# Patient Record
Sex: FEMALE | Race: WHITE | NOT HISPANIC OR LATINO | URBAN - METROPOLITAN AREA
[De-identification: names, ages, dates, MRNs, and addresses within clinical notes are randomized per-mention and may not be internally consistent; named-entity substitution may affect disease eponyms.]

---

## 2024-11-01 ENCOUNTER — EMERGENCY (EMERGENCY)
Facility: HOSPITAL | Age: 24
LOS: 1 days | Discharge: HOME | End: 2024-11-01
Admitting: EMERGENCY MEDICINE
Payer: MEDICAID

## 2024-11-01 VITALS
HEART RATE: 93 BPM | RESPIRATION RATE: 16 BRPM | WEIGHT: 169.98 LBS | DIASTOLIC BLOOD PRESSURE: 80 MMHG | TEMPERATURE: 98 F | OXYGEN SATURATION: 97 % | SYSTOLIC BLOOD PRESSURE: 116 MMHG

## 2024-11-01 DIAGNOSIS — H60.91 UNSPECIFIED OTITIS EXTERNA, RIGHT EAR: ICD-10-CM

## 2024-11-01 DIAGNOSIS — H92.09 OTALGIA, UNSPECIFIED EAR: ICD-10-CM

## 2024-11-01 PROCEDURE — 99283 EMERGENCY DEPT VISIT LOW MDM: CPT | Mod: 25

## 2024-11-01 RX ORDER — KETOROLAC TROMETHAMINE 30 MG/ML
30 INJECTION INTRAMUSCULAR; INTRAVENOUS ONCE
Refills: 0 | Status: DISCONTINUED | OUTPATIENT
Start: 2024-11-01 | End: 2024-11-01

## 2024-11-01 RX ADMIN — KETOROLAC TROMETHAMINE 30 MILLIGRAM(S): 30 INJECTION INTRAMUSCULAR; INTRAVENOUS at 23:14

## 2024-11-01 NOTE — ED ADULT NURSE NOTE - OBJECTIVE STATEMENT
Patient presents with right ear pain radiating down to the neck, discharge/drainage, difficulty and muffled hearing x 3 days. Patient stated she has frequent ear infection when she was younger, had Ciprodex at home which she starts taking it and is taking antibiotic that was prescribed to her. denies fever, chills, headache, nausea, vomiting, dizziness.

## 2024-11-01 NOTE — ED PROVIDER NOTE - CLINICAL SUMMARY MEDICAL DECISION MAKING FREE TEXT BOX
Pt presents to this ED for otitis externa and pain  VSS, no fevers or tenderness over the mastoid processes - mastoiditis unlikely  No FB noted on exam, denies FB placement  Denies changes to altittude, no TM perforation noted on PE - TM perforation unlikely  No vesicles/bloody effusions noted on PE, no recent dx of pneumoia - bullous myringitis unlikely  Otitis externa most likely diagnosis   All results reviewed with pt. Pt understands and agrees with plan. Agreed to follow up with pcp in 2-3 days.

## 2024-11-01 NOTE — ED PROVIDER NOTE - PATIENT PORTAL LINK FT
You can access the FollowMyHealth Patient Portal offered by [facility] by registering at the following website: [email or web address]. By joining [employer]’s FollowMyHealth portal, you will also be able to view your health information using other applications (apps) compatible with our system.

## 2024-11-01 NOTE — ED PROVIDER NOTE - NSFOLLOWUPINSTRUCTIONS_ED_ALL_ED_FT
Overview  Swimmer's ear (otitis externa) is inflammation or infection of the ear canal. This is the passage that leads from the outer ear to the eardrum. Any water, sand, or other debris that gets into the ear canal and stays there can cause swimmer's ear. Putting cotton swabs or other items in the ear to clean it can also cause this problem.    Swimmer's ear can be very painful. But you can treat the pain and infection with medicines. You should feel better in a few days.    Follow-up care is a key part of your treatment and safety. Be sure to make and go to all appointments, and call your doctor or nurse advice line (785 in most provinces and territories) if you are having problems. It's also a good idea to know your test results and keep a list of the medicines you take.    How can you care for yourself at home?  Cleaning and care  Use antibiotic drops as your doctor directs.  Do not insert eardrops (other than the antibiotic eardrops) or anything else into the ear unless your doctor has told you to.  Avoid getting water in the ear until the problem clears up. Use cotton lightly coated with petroleum jelly as an earplug. Do not use plastic earplugs.  Use a hair dryer set on low to carefully dry the ear after you shower.  To ease ear pain, hold a warm face cloth against your ear.  Take pain medicines exactly as directed.  If the doctor gave you a prescription medicine for pain, take it as prescribed.  If you are not taking a prescription pain medicine, ask your doctor if you can take an over-the-counter medicine.  Inserting eardrops  Warm the drops to body temperature by rolling the container in your hands. Or you can place it in a cup of warm water for a few minutes.  Lie down, with your ear facing up.  Place drops inside the ear. Follow your doctor's instructions (or the directions on the label) for how many drops to use. Gently wiggle the outer ear or pull the ear up and back to help the drops get into the ear.  It's important to keep the liquid in the ear canal for 3 to 5 minutes.  When should you call for help?  	  Call your doctor or nurse advice line now or seek immediate medical care if:    You have a new or higher fever.  You have new or worse pain, swelling, warmth, or redness around or behind your ear.  You have new or increasing pus or blood draining from your ear.  Watch closely for changes in your health, and be sure to contact your doctor or nurse advice line if:    You are not getting better after 2 days (48 hours).

## 2024-11-01 NOTE — ED PROVIDER NOTE - OBJECTIVE STATEMENT
25 yo F presents to this ED for ear pain and clogged hearing. Pt states that when she was younger she got frequent ear infections so "I just had a lot of ciprodex lying around". Well 4 days ago she started having pain and when the Ciprodex did not work she went to the ED. At the ED she was prescribed Augmentin and followed up with an ENT the next day who precribed abx drops. States that she couldn't get it so she called back and prednisone was added to the cocktail. Couldn't pick it up so she came here and is requesting anythign for the pain. Sates that pain is constand and throbbing. Denies d/c, bleeding from ear. Denies pain behind ear. Denies any recent changes in altitude. No changes to hearing. Denies putting FB in ear. Has not been swimming recently.

## 2024-11-01 NOTE — ED ADULT NURSE NOTE - CHIEF COMPLAINT QUOTE
Cervical Collar Clearance: The patient had a CT scan of the cervical spine demonstrating no acute injury  On exam, the patient had no midline point tenderness or paresthesias/numbness/weakness in the extremities  The patient had full range of motion (was then able to flex, extend, and rotate head laterally) without pain  There were no distracting injuries and the patient was not intoxicated  The patient's cervical spine was cleared radiologically and clinically  Cervical collar removed at this time       MOISES Antonio  8/21/2022 10:24 AM pt reports right ear pain, discharge, difficulty hearing x 3 days; denies fever

## 2024-11-01 NOTE — ED PROVIDER NOTE - PHYSICAL EXAMINATION
General: well developed, well nourished, no distress  Eyes: no scleral injection  External Auditory canal erythematous with white flakes noted. No tenderness over the mastoid processes. Some tenderness to maipulation of pinna    Neck: non-tender, full range of motion, supple.   Respiratory: unlabored breathing  Cardiovascular: no central cyanosis  Musculoskeletal: normal gait.   Extremities: normal range of motion, non-tender.  Neurologic: alert, oriented to person, oriented to place, oriented to time.    Skin: normal color.  Negative For: rash  Psychiatric: normal affect, normal insight, normal concentration

## 2025-03-13 ENCOUNTER — EMERGENCY (EMERGENCY)
Age: 25
LOS: 1 days | Discharge: HOME | End: 2025-03-13
Attending: EMERGENCY MEDICINE | Admitting: EMERGENCY MEDICINE
Payer: COMMERCIAL

## 2025-03-13 VITALS
HEART RATE: 80 BPM | TEMPERATURE: 98 F | OXYGEN SATURATION: 96 % | SYSTOLIC BLOOD PRESSURE: 128 MMHG | RESPIRATION RATE: 18 BRPM | DIASTOLIC BLOOD PRESSURE: 91 MMHG

## 2025-03-13 DIAGNOSIS — T74.21XA ADULT SEXUAL ABUSE, CONFIRMED, INITIAL ENCOUNTER: ICD-10-CM

## 2025-03-13 DIAGNOSIS — F98.8 OTHER SPECIFIED BEHAVIORAL AND EMOTIONAL DISORDERS WITH ONSET USUALLY OCCURRING IN CHILDHOOD AND ADOLESCENCE: ICD-10-CM
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
## 2025-03-13 DIAGNOSIS — F32.A DEPRESSION, UNSPECIFIED: ICD-10-CM

## 2025-03-13 DIAGNOSIS — J45.909 UNSPECIFIED ASTHMA, UNCOMPLICATED: ICD-10-CM
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
## 2025-03-13 PROBLEM — Z78.9 OTHER SPECIFIED HEALTH STATUS: Chronic | Status: ACTIVE | Noted: 2024-11-01

## 2025-03-13 LAB
ALBUMIN SERPL ELPH-MCNC: 3.9 G/DL — SIGNIFICANT CHANGE UP (ref 3.4–5)
ALP SERPL-CCNC: 76 U/L — SIGNIFICANT CHANGE UP (ref 40–120)
ALT FLD-CCNC: 19 U/L — SIGNIFICANT CHANGE UP (ref 12–42)
ANION GAP SERPL CALC-SCNC: 11 MMOL/L — SIGNIFICANT CHANGE UP (ref 9–16)
AST SERPL-CCNC: 23 U/L — SIGNIFICANT CHANGE UP (ref 15–37)
BILIRUB SERPL-MCNC: 0.5 MG/DL — SIGNIFICANT CHANGE UP (ref 0.2–1.2)
BUN SERPL-MCNC: 15 MG/DL — SIGNIFICANT CHANGE UP (ref 7–23)
CALCIUM SERPL-MCNC: 8.8 MG/DL — SIGNIFICANT CHANGE UP (ref 8.5–10.5)
CHLORIDE SERPL-SCNC: 106 MMOL/L — SIGNIFICANT CHANGE UP (ref 96–108)
CO2 SERPL-SCNC: 22 MMOL/L — SIGNIFICANT CHANGE UP (ref 22–31)
CREAT SERPL-MCNC: 0.72 MG/DL — SIGNIFICANT CHANGE UP (ref 0.5–1.3)
EGFR: 120 ML/MIN/1.73M2 — SIGNIFICANT CHANGE UP
EGFR: 120 ML/MIN/1.73M2 — SIGNIFICANT CHANGE UP
GLUCOSE SERPL-MCNC: 86 MG/DL — SIGNIFICANT CHANGE UP (ref 70–99)
HCG SERPL-ACNC: 1 MIU/ML — SIGNIFICANT CHANGE UP
HCT VFR BLD CALC: 38.6 % — SIGNIFICANT CHANGE UP (ref 34.5–45)
HGB BLD-MCNC: 12.7 G/DL — SIGNIFICANT CHANGE UP (ref 11.5–15.5)
HIV 1 & 2 AB SERPL IA.RAPID: SIGNIFICANT CHANGE UP
MCHC RBC-ENTMCNC: 27.5 PG — SIGNIFICANT CHANGE UP (ref 27–34)
MCHC RBC-ENTMCNC: 32.9 G/DL — SIGNIFICANT CHANGE UP (ref 32–36)
MCV RBC AUTO: 83.5 FL — SIGNIFICANT CHANGE UP (ref 80–100)
NRBC # BLD AUTO: 0 K/UL — SIGNIFICANT CHANGE UP (ref 0–0)
NRBC # FLD: 0 K/UL — SIGNIFICANT CHANGE UP (ref 0–0)
NRBC BLD AUTO-RTO: 0 /100 WBCS — SIGNIFICANT CHANGE UP (ref 0–0)
PLATELET # BLD AUTO: 244 K/UL — SIGNIFICANT CHANGE UP (ref 150–400)
PMV BLD: 10.1 FL — SIGNIFICANT CHANGE UP (ref 7–13)
POTASSIUM SERPL-MCNC: 3.6 MMOL/L — SIGNIFICANT CHANGE UP (ref 3.5–5.3)
POTASSIUM SERPL-SCNC: 3.6 MMOL/L — SIGNIFICANT CHANGE UP (ref 3.5–5.3)
PROT SERPL-MCNC: 7.3 G/DL — SIGNIFICANT CHANGE UP (ref 6.4–8.2)
RBC # BLD: 4.62 M/UL — SIGNIFICANT CHANGE UP (ref 3.8–5.2)
RBC # FLD: 13.6 % — SIGNIFICANT CHANGE UP (ref 10.3–14.5)
SODIUM SERPL-SCNC: 139 MMOL/L — SIGNIFICANT CHANGE UP (ref 132–145)
WBC # BLD: 7.07 K/UL — SIGNIFICANT CHANGE UP (ref 3.8–10.5)
WBC # FLD AUTO: 7.07 K/UL — SIGNIFICANT CHANGE UP (ref 3.8–10.5)

## 2025-03-13 PROCEDURE — 73630 X-RAY EXAM OF FOOT: CPT | Mod: 26,RT

## 2025-03-13 PROCEDURE — 99053 MED SERV 10PM-8AM 24 HR FAC: CPT

## 2025-03-13 PROCEDURE — 73610 X-RAY EXAM OF ANKLE: CPT | Mod: 26,RT

## 2025-03-13 PROCEDURE — 99284 EMERGENCY DEPT VISIT MOD MDM: CPT

## 2025-03-13 RX ORDER — LAMOTRIGINE 150 MG/1
0 TABLET ORAL
Refills: 0 | DISCHARGE

## 2025-03-13 RX ORDER — RALTEGRAVIR 400 MG/1
1 TABLET, FILM COATED ORAL
Qty: 60 | Refills: 0
Start: 2025-03-13 | End: 2025-04-11

## 2025-03-13 RX ORDER — DOXYCYCLINE HYCLATE 100 MG
1 TABLET ORAL
Qty: 14 | Refills: 0
Start: 2025-03-13 | End: 2025-03-19

## 2025-03-13 RX ORDER — ONDANSETRON HCL/PF 4 MG/2 ML
1 VIAL (ML) INJECTION
Qty: 1 | Refills: 0
Start: 2025-03-13

## 2025-03-13 RX ORDER — DEXTROAMPHETAMINE SACCHARATE, AMPHETAMINE ASPARTATE MONOHYDRATE, DEXTROAMPHETAMINE SULFATE AND AMPHETAMINE SULFATE 2.5; 2.5; 2.5; 2.5 MG/1; MG/1; MG/1; MG/1
0 CAPSULE, EXTENDED RELEASE ORAL
Refills: 0 | DISCHARGE

## 2025-03-13 RX ORDER — ACETAMINOPHEN 500 MG/5ML
650 LIQUID (ML) ORAL ONCE
Refills: 0 | Status: COMPLETED | OUTPATIENT
Start: 2025-03-13 | End: 2025-03-13

## 2025-03-13 RX ORDER — FLUCONAZOLE 150 MG
150 TABLET ORAL ONCE
Refills: 0 | Status: COMPLETED | OUTPATIENT
Start: 2025-03-13 | End: 2025-03-13

## 2025-03-13 RX ORDER — SERTRALINE 100 MG/1
0 TABLET, FILM COATED ORAL
Refills: 0 | DISCHARGE

## 2025-03-13 RX ORDER — METRONIDAZOLE 250 MG
500 TABLET ORAL ONCE
Refills: 0 | Status: COMPLETED | OUTPATIENT
Start: 2025-03-13 | End: 2025-03-13

## 2025-03-13 RX ORDER — DOXYCYCLINE HYCLATE 100 MG
100 TABLET ORAL ONCE
Refills: 0 | Status: COMPLETED | OUTPATIENT
Start: 2025-03-13 | End: 2025-03-13

## 2025-03-13 RX ORDER — LEVONORGESTREL 1.5 MG/1
1.5 TABLET ORAL ONCE
Refills: 0 | Status: COMPLETED | OUTPATIENT
Start: 2025-03-13 | End: 2025-03-13

## 2025-03-13 RX ORDER — METRONIDAZOLE 250 MG
1 TABLET ORAL
Qty: 14 | Refills: 0
Start: 2025-03-13 | End: 2025-03-19

## 2025-03-13 RX ORDER — CEFTRIAXONE 500 MG/1
500 INJECTION, POWDER, FOR SOLUTION INTRAMUSCULAR; INTRAVENOUS ONCE
Refills: 0 | Status: COMPLETED | OUTPATIENT
Start: 2025-03-13 | End: 2025-03-13

## 2025-03-13 RX ORDER — RALTEGRAVIR 400 MG/1
400 TABLET, FILM COATED ORAL ONCE
Refills: 0 | Status: COMPLETED | OUTPATIENT
Start: 2025-03-13 | End: 2025-03-13

## 2025-03-13 RX ORDER — ONDANSETRON HCL/PF 4 MG/2 ML
4 VIAL (ML) INJECTION ONCE
Refills: 0 | Status: COMPLETED | OUTPATIENT
Start: 2025-03-13 | End: 2025-03-13

## 2025-03-14 LAB
C TRACH RRNA SPEC QL NAA+PROBE: SIGNIFICANT CHANGE UP
N GONORRHOEA RRNA SPEC QL NAA+PROBE: SIGNIFICANT CHANGE UP
SPECIMEN SOURCE: SIGNIFICANT CHANGE UP

## 2025-03-15 ENCOUNTER — APPOINTMENT (OUTPATIENT)
Dept: AFTER HOURS CARE | Facility: EMERGENCY ROOM | Age: 25
End: 2025-03-15
Payer: MEDICAID

## 2025-03-15 DIAGNOSIS — J45.31 MILD PERSISTENT ASTHMA WITH (ACUTE) EXACERBATION: ICD-10-CM

## 2025-03-15 DIAGNOSIS — J06.9 ACUTE UPPER RESPIRATORY INFECTION, UNSPECIFIED: ICD-10-CM

## 2025-03-15 PROBLEM — Z00.00 ENCOUNTER FOR PREVENTIVE HEALTH EXAMINATION: Status: ACTIVE | Noted: 2025-03-15

## 2025-03-15 PROCEDURE — 99204 OFFICE O/P NEW MOD 45 MIN: CPT | Mod: 95

## 2025-03-15 RX ORDER — ALBUTEROL SULFATE 90 UG/1
108 (90 BASE) INHALANT RESPIRATORY (INHALATION)
Qty: 1 | Refills: 3 | Status: ACTIVE | COMMUNITY
Start: 2025-03-15 | End: 1900-01-01

## 2025-03-15 RX ORDER — PREDNISONE 20 MG/1
20 TABLET ORAL
Qty: 15 | Refills: 0 | Status: ACTIVE | COMMUNITY
Start: 2025-03-15 | End: 1900-01-01

## 2025-03-18 ENCOUNTER — TRANSCRIPTION ENCOUNTER (OUTPATIENT)
Age: 25
End: 2025-03-18

## 2025-06-26 ENCOUNTER — EMERGENCY (EMERGENCY)
Facility: HOSPITAL | Age: 25
LOS: 1 days | End: 2025-06-26
Attending: EMERGENCY MEDICINE | Admitting: EMERGENCY MEDICINE
Payer: MEDICAID

## 2025-06-26 VITALS
TEMPERATURE: 99 F | SYSTOLIC BLOOD PRESSURE: 118 MMHG | HEART RATE: 84 BPM | OXYGEN SATURATION: 98 % | DIASTOLIC BLOOD PRESSURE: 84 MMHG | RESPIRATION RATE: 16 BRPM

## 2025-06-26 VITALS
DIASTOLIC BLOOD PRESSURE: 90 MMHG | RESPIRATION RATE: 18 BRPM | HEIGHT: 67 IN | WEIGHT: 154.98 LBS | SYSTOLIC BLOOD PRESSURE: 150 MMHG | HEART RATE: 103 BPM | OXYGEN SATURATION: 97 % | TEMPERATURE: 99 F

## 2025-06-26 VITALS
OXYGEN SATURATION: 98 % | HEIGHT: 67 IN | WEIGHT: 145.06 LBS | RESPIRATION RATE: 16 BRPM | HEART RATE: 79 BPM | TEMPERATURE: 98 F | DIASTOLIC BLOOD PRESSURE: 83 MMHG | SYSTOLIC BLOOD PRESSURE: 122 MMHG

## 2025-06-26 DIAGNOSIS — R11.10 VOMITING, UNSPECIFIED: ICD-10-CM

## 2025-06-26 DIAGNOSIS — Z97.5 PRESENCE OF (INTRAUTERINE) CONTRACEPTIVE DEVICE: ICD-10-CM

## 2025-06-26 DIAGNOSIS — N83.201 UNSPECIFIED OVARIAN CYST, RIGHT SIDE: ICD-10-CM

## 2025-06-26 DIAGNOSIS — Z87.891 PERSONAL HISTORY OF NICOTINE DEPENDENCE: ICD-10-CM

## 2025-06-26 DIAGNOSIS — R10.2 PELVIC AND PERINEAL PAIN: ICD-10-CM

## 2025-06-26 LAB
ALBUMIN SERPL ELPH-MCNC: 3.8 G/DL — SIGNIFICANT CHANGE UP (ref 3.4–5)
ALP SERPL-CCNC: 61 U/L — SIGNIFICANT CHANGE UP (ref 40–120)
ALT FLD-CCNC: 21 U/L — SIGNIFICANT CHANGE UP (ref 12–42)
ANION GAP SERPL CALC-SCNC: 6 MMOL/L — LOW (ref 9–16)
APPEARANCE UR: CLEAR — SIGNIFICANT CHANGE UP
AST SERPL-CCNC: 24 U/L — SIGNIFICANT CHANGE UP (ref 15–37)
BASOPHILS # BLD AUTO: 0.05 K/UL — SIGNIFICANT CHANGE UP (ref 0–0.2)
BASOPHILS NFR BLD AUTO: 1.1 % — SIGNIFICANT CHANGE UP (ref 0–2)
BILIRUB SERPL-MCNC: 0.5 MG/DL — SIGNIFICANT CHANGE UP (ref 0.2–1.2)
BILIRUB UR-MCNC: NEGATIVE — SIGNIFICANT CHANGE UP
BUN SERPL-MCNC: 7 MG/DL — SIGNIFICANT CHANGE UP (ref 7–23)
CALCIUM SERPL-MCNC: 8.9 MG/DL — SIGNIFICANT CHANGE UP (ref 8.5–10.5)
CHLORIDE SERPL-SCNC: 106 MMOL/L — SIGNIFICANT CHANGE UP (ref 96–108)
CO2 SERPL-SCNC: 28 MMOL/L — SIGNIFICANT CHANGE UP (ref 22–31)
COLOR SPEC: YELLOW — SIGNIFICANT CHANGE UP
CREAT SERPL-MCNC: 0.76 MG/DL — SIGNIFICANT CHANGE UP (ref 0.5–1.3)
DIFF PNL FLD: ABNORMAL
EGFR: 112 ML/MIN/1.73M2 — SIGNIFICANT CHANGE UP
EGFR: 112 ML/MIN/1.73M2 — SIGNIFICANT CHANGE UP
EOSINOPHIL # BLD AUTO: 0.08 K/UL — SIGNIFICANT CHANGE UP (ref 0–0.5)
EOSINOPHIL NFR BLD AUTO: 1.7 % — SIGNIFICANT CHANGE UP (ref 0–6)
GLUCOSE SERPL-MCNC: 115 MG/DL — HIGH (ref 70–99)
GLUCOSE UR QL: NEGATIVE MG/DL — SIGNIFICANT CHANGE UP
HCG SERPL-ACNC: <1 MIU/ML — SIGNIFICANT CHANGE UP
HCT VFR BLD CALC: 38.6 % — SIGNIFICANT CHANGE UP (ref 34.5–45)
HGB BLD-MCNC: 12.7 G/DL — SIGNIFICANT CHANGE UP (ref 11.5–15.5)
IMM GRANULOCYTES # BLD AUTO: 0 K/UL — SIGNIFICANT CHANGE UP (ref 0–0.07)
IMM GRANULOCYTES NFR BLD AUTO: 0 % — SIGNIFICANT CHANGE UP (ref 0–0.9)
KETONES UR QL: NEGATIVE MG/DL — SIGNIFICANT CHANGE UP
LEUKOCYTE ESTERASE UR-ACNC: NEGATIVE — SIGNIFICANT CHANGE UP
LYMPHOCYTES # BLD AUTO: 1.49 K/UL — SIGNIFICANT CHANGE UP (ref 1–3.3)
LYMPHOCYTES NFR BLD AUTO: 31.4 % — SIGNIFICANT CHANGE UP (ref 13–44)
MCHC RBC-ENTMCNC: 29.6 PG — SIGNIFICANT CHANGE UP (ref 27–34)
MCHC RBC-ENTMCNC: 32.9 G/DL — SIGNIFICANT CHANGE UP (ref 32–36)
MCV RBC AUTO: 90 FL — SIGNIFICANT CHANGE UP (ref 80–100)
MONOCYTES # BLD AUTO: 0.45 K/UL — SIGNIFICANT CHANGE UP (ref 0–0.9)
MONOCYTES NFR BLD AUTO: 9.5 % — SIGNIFICANT CHANGE UP (ref 2–14)
NEUTROPHILS # BLD AUTO: 2.67 K/UL — SIGNIFICANT CHANGE UP (ref 1.8–7.4)
NEUTROPHILS NFR BLD AUTO: 56.3 % — SIGNIFICANT CHANGE UP (ref 43–77)
NITRITE UR-MCNC: NEGATIVE — SIGNIFICANT CHANGE UP
NRBC # BLD AUTO: 0 K/UL — SIGNIFICANT CHANGE UP (ref 0–0)
NRBC # FLD: 0 K/UL — SIGNIFICANT CHANGE UP (ref 0–0)
NRBC BLD AUTO-RTO: 0 /100 WBCS — SIGNIFICANT CHANGE UP (ref 0–0)
PH UR: 6.5 — SIGNIFICANT CHANGE UP (ref 5–8)
PLATELET # BLD AUTO: 183 K/UL — SIGNIFICANT CHANGE UP (ref 150–400)
PMV BLD: 10.2 FL — SIGNIFICANT CHANGE UP (ref 7–13)
POTASSIUM SERPL-MCNC: 3.8 MMOL/L — SIGNIFICANT CHANGE UP (ref 3.5–5.3)
POTASSIUM SERPL-SCNC: 3.8 MMOL/L — SIGNIFICANT CHANGE UP (ref 3.5–5.3)
PROT SERPL-MCNC: 7.1 G/DL — SIGNIFICANT CHANGE UP (ref 6.4–8.2)
PROT UR-MCNC: NEGATIVE MG/DL — SIGNIFICANT CHANGE UP
RBC # BLD: 4.29 M/UL — SIGNIFICANT CHANGE UP (ref 3.8–5.2)
RBC # FLD: 13.5 % — SIGNIFICANT CHANGE UP (ref 10.3–14.5)
SODIUM SERPL-SCNC: 140 MMOL/L — SIGNIFICANT CHANGE UP (ref 132–145)
SP GR SPEC: 1.01 — SIGNIFICANT CHANGE UP (ref 1–1.03)
UROBILINOGEN FLD QL: 0.2 MG/DL — SIGNIFICANT CHANGE UP (ref 0.2–1)
WBC # BLD: 4.74 K/UL — SIGNIFICANT CHANGE UP (ref 3.8–10.5)
WBC # FLD AUTO: 4.74 K/UL — SIGNIFICANT CHANGE UP (ref 3.8–10.5)

## 2025-06-26 PROCEDURE — 76830 TRANSVAGINAL US NON-OB: CPT | Mod: 26

## 2025-06-26 PROCEDURE — 99284 EMERGENCY DEPT VISIT MOD MDM: CPT | Mod: 25

## 2025-06-26 PROCEDURE — 99285 EMERGENCY DEPT VISIT HI MDM: CPT

## 2025-06-26 PROCEDURE — 76856 US EXAM PELVIC COMPLETE: CPT | Mod: 26,59

## 2025-06-26 NOTE — ED PROVIDER NOTE - AVIAN FLU SYMPTOMS
----- Message from TAIWO Russell-CNS sent at 6/14/2023 10:05 PM EDT -----  Please let patient know that Mammogram is normal. Thank you!   No

## 2025-06-26 NOTE — ED ADULT NURSE NOTE - OBJECTIVE STATEMENT
Pt with hx of ovarian cyst; with c/o 3 hr of LLQ pain, confirmed cyst noted on US by [facility], transferred to r/o intermittent torsion. Pt A&Ox4, GCS 15, ambulatory, denies pain currently. Pt with hx of ovarian cyst; with c/o 3 hr of LLQ pain, confirmed cyst noted on US by [facility], transferred to r/o intermittent torsion. Pt A&Ox4, GCS 15, ambulatory, denies pain currently, reports "it just comes in waves" endorses some nausea during waves of pain but denies at other times. Pt with hx of ovarian cyst; with c/o LLQ pain starting this afternoon ~3hrs PTA at [facility], confirmed cyst noted on US by [facility], transferred to r/o intermittent torsion. Pt A&Ox4, GCS 15, ambulatory, denies pain currently, reports "it just comes in waves" endorses some nausea during waves of pain but denies at other times.

## 2025-06-26 NOTE — ED PROVIDER NOTE - PHYSICAL EXAMINATION
Physical Exam  GEN: Awake, alert, non-toxic appearing, NCAT  EYES: PERRL, full EOMI,  ENT: External inspection normal, normal voice, no oropharyngeal ulcerations/lesions/swelling  HEAD: atraumatic  NECK: FROM neck, supple, no meningismus, trachea midline, no JVD  CV: rrr, no edema  RESP: cta bl, no tachypnea, no hypoxia, no resp distress,  GI: suprapubic  region TTP generalized   MSK: FROM all 4 extremities, N/V intact,   SKIN: Color normal for race, warm and dry, no rash  NEURO: Oriented x3, CN 2-12 grossly intact, normal motor, normal sensory

## 2025-06-26 NOTE — ED PROVIDER NOTE - OBJECTIVE STATEMENT
25-year-old female transferred from  [facility]  for 4.6 cm hemorrhagic cyst and GYN evaluation.  Patient states she has had waxing waning pain first and it started 1 week ago went away then started again around 2 PM today comes in waves right-sided associated nausea and vomiting no fever chills cough.  Concern for torsion detorsion.  On arrival patient states pain is minimal 2 out of 10

## 2025-06-26 NOTE — ED ADULT NURSE NOTE - NSFALLRISKFACTORS_ED_ALL_ED
Quality 111:Pneumonia Vaccination Status For Older Adults: Pneumococcal Vaccination Previously Received Quality 131: Pain Assessment And Follow-Up: Pain assessment NOT documented as being performed, documentation the patient is not eligible for a pain assessment using a standardized tool Quality 110: Preventive Care And Screening: Influenza Immunization: Influenza Immunization Administered during Influenza season Detail Level: Detailed No indicators present

## 2025-06-26 NOTE — ED ADULT TRIAGE NOTE - CHIEF COMPLAINT QUOTE
walk in c/o lower abd pain x3 hours, was seen by GYN and diagnosed with ovarian cysts on tuesday. took 1 midol 2 hours ago with no relief

## 2025-06-26 NOTE — ED ADULT NURSE NOTE - OBJECTIVE STATEMENT
Pt in ED d/t lower abd pain. Per pt, was seen by GYN 3 days ago and was diagnosed with ovarian cyst. Reports pain has gotten worse. Took midol w/o relief 2 hrs prior to arrival. Denies vaginal bleeding. AOx4, GCS 15.

## 2025-06-26 NOTE — ED PROVIDER NOTE - CLINICAL SUMMARY MEDICAL DECISION MAKING FREE TEXT BOX
Patient coming in with waxing waning pelvic pain.  Recently diagnosed with cysts at the GYN office.  Exam with suprapubic tenderness.  Will plan for ultrasound, labs    GYN consulted, advised transfer uptown to rule out torsion/detorsion.  Patient will be transferred to uptown ED

## 2025-06-26 NOTE — ED PROVIDER NOTE - CLINICAL SUMMARY MEDICAL DECISION MAKING FREE TEXT BOX
25-year-old female with hemorrhagic ovarian cyst concern for ovarian torsion detorsion currently pain is controlled 2 out of 10 will plan for GYN consultation monitoring closely for recurrence of pain

## 2025-06-26 NOTE — ED PROVIDER NOTE - CCCP TRG CHIEF CMPLNT
Alert-The patient is alert, awake and responds to voice. The patient is oriented to time, place, and person. The triage nurse is able to obtain subjective information. abdominal pain no

## 2025-06-26 NOTE — ED PROVIDER NOTE - NSFOLLOWUPCLINICS_GEN_ALL_ED_FT
[facility] - [facility]  OBGYN  [address] [place] [address]  Phone: [phone]  Fax: [phone]

## 2025-06-26 NOTE — ED PROVIDER NOTE - NSFOLLOWUPINSTRUCTIONS_ED_ALL_ED_FT
Ovarian Cyst  The female reproductive system, with a close-up of the ovaries and an ovarian cyst.  An ovarian cyst is a fluid-filled sac that forms on an ovary. The ovaries are small organs that produce eggs in females. Many types of cysts can form on the ovaries. Most of these cysts go away on their own and are not cancer. Some cysts need treatment.    What are the causes?  Ovarian cysts may be caused by:  Chemical or hormone changes in your body during your normal monthly period.  Polycystic ovarian syndrome (PCOS). This is a common problem of the hormones.  Endometriosis. The tissue that lines the inside of the uterus grows outside of the uterus. If it grows on your ovaries, it can form cysts.  Pregnancy. Your body makes more hormones than normal to support the pregnancy. This can form cysts.  Infection. Infection in your uterus can spread to your ovaries and can form cysts.  What increases the risk?  You are more likely to get this condition if:  You take medicines to help you get pregnant.  You have family members who have ovarian cysts.  What are the signs or symptoms?  Many ovarian cysts do not cause symptoms. If you have symptoms, they may include:  Pain or pressure around the pelvis. The pelvis is the area between the hip bones.  Pain in the lower belly.  Pain during sex.  Swelling in the lower belly.  Periods that don't come at the same time each month.  Pain during a period.  How is this diagnosed?  These cysts are found during a routine exam of the pelvis. You may have other tests to find out more about the cysts. Tests include ultrasound, CT scan, MRI, and blood tests.    How is this treated?  Many ovarian cysts go away on their own without treatment. When treatment is needed, it may include:  Medicines to help treat pain.  A procedure to drain the cyst.  Surgery to take out the cyst.  Hormones or birth control pills.  Surgery to take out the ovary.  Follow these instructions at home:  Take all medicines only as told by your health care provider.  If told, get Pap tests and regular exams of the pelvis.  Do not smoke, vape, or use nicotine or tobacco.  Return to your normal activities when your provider says that it's safe.  Keep all follow-up visits. Your provider may want to check your cyst for 2–3 months to see if it changes.  If you're in menopause, it's important to have your cyst checked closely because females in this age group have a higher rate of cancer of the ovaries.  Contact a health care provider if:  You have any new symptoms.  Your symptoms get worse.  Get help right away if:  You have really bad pain in the belly or pelvis, and the pain comes on all of a sudden.  You have heavy bleeding that soaks through more than one pad every hour.  This information is not intended to replace advice given to you by your health care provider. Make sure you discuss any questions you have with your health care provider.    Document Revised: 09/19/2024 Document Reviewed: 04/16/2024

## 2025-06-26 NOTE — ED PROVIDER NOTE - OBJECTIVE STATEMENT
25-year-old female now coming in stating she has some pelvic pain that waxed and waned today describing as intense at its peak.  Patient states she was recently diagnosed with cysts at her GYNs on Tuesday.  States she had recently had an IUD placed and thought that might be the pain.  The GYN had told her that her IUD was in place.  Patient denies past medical history.  Denies fevers, chills, chest pain, shortness of breath.

## 2025-06-26 NOTE — ED PROVIDER NOTE - ATTENDING APP SHARED VISIT CONTRIBUTION OF CARE
Patient was interviewed and examined by the PA.  Case discussed with me and management plan formulated together.  19:55:  Patient reassessed together.  We discussed results, possible dx of torsion, need to transfer. Patient comfortable now, but just had an episode of pain prior to our assessment.  Abdomen is soft, mld suprapubic tenderness without guarding or rebound.  Patient agreed with transfer plan. PA has spoken to GYN and ER at [facility] who accept patient to ER.

## 2025-06-26 NOTE — ED PROVIDER NOTE - PATIENT PORTAL LINK FT
You can access the FollowMyHealth Patient Portal offered by [facility] by registering at the following website: [email or web address]. By joining [employer]’s FollowMyHealth portal, you will also be able to view your health information using other applications (apps) compatible with our system.

## 2025-06-27 VITALS
TEMPERATURE: 99 F | RESPIRATION RATE: 18 BRPM | HEART RATE: 65 BPM | SYSTOLIC BLOOD PRESSURE: 120 MMHG | DIASTOLIC BLOOD PRESSURE: 75 MMHG | OXYGEN SATURATION: 99 %

## 2025-06-27 PROCEDURE — 96374 THER/PROPH/DIAG INJ IV PUSH: CPT

## 2025-06-27 PROCEDURE — 96375 TX/PRO/DX INJ NEW DRUG ADDON: CPT

## 2025-06-27 PROCEDURE — 99284 EMERGENCY DEPT VISIT MOD MDM: CPT | Mod: 25

## 2025-06-27 RX ORDER — ACETAMINOPHEN 500 MG/5ML
1000 LIQUID (ML) ORAL ONCE
Refills: 0 | Status: COMPLETED | OUTPATIENT
Start: 2025-06-27 | End: 2025-06-27

## 2025-06-27 RX ORDER — KETOROLAC TROMETHAMINE 30 MG/ML
15 INJECTION, SOLUTION INTRAMUSCULAR; INTRAVENOUS ONCE
Refills: 0 | Status: DISCONTINUED | OUTPATIENT
Start: 2025-06-27 | End: 2025-06-27

## 2025-06-27 RX ADMIN — Medication 400 MILLIGRAM(S): at 01:10

## 2025-06-27 RX ADMIN — KETOROLAC TROMETHAMINE 15 MILLIGRAM(S): 30 INJECTION, SOLUTION INTRAMUSCULAR; INTRAVENOUS at 01:10

## 2025-06-27 NOTE — CONSULT NOTE ADULT - ASSESSMENT
A&P: 24yo G0 with RLQ pain and TVUS showing 4.5cm adnexal cyst, GYN consult for r/o torsion  - Hemodynamically stable, afebrile, benign abdominal exam, labs wnl, all reassuring against active torsion  - Explained to pt impossible to r/o intermittent torsion however it is not usually occurring every few minutes  - Discussed that if she had ruptured cyst on Tuesday that could be responsible for the free fluid seen on US which can be irritating and cause pain until fully absorbed   - Discussed that R ovary does have 4.6cm hemorrhagic cyst which could possibly cause intermittent torsion  - Given no signs of active torsion there is no indication for urgent surgical intervention    Pt reevaluated after 1.5hrs to repeat abdominal exam:   - Abd remains benign - soft, no tenderness to deep palpation, nondistended   - No acute GYN intervention indicated  - Encouraged pt to follow up with GYN soon and discuss elective ovarian cystectomy to prevent possibility of intermittent torsion  - Strict return precautions - if severe pain, N/V, syncope/presyncope to report to nearest ED due to possibility of torsion. Discussed need for urgent intervention to rpevent permanent damage to ovary, pt in understanding  - Pt in agreement with plan - strict f/u with GYN, possible elective cystectomy, and strict return precautions  - All questions answered    Esteban Gongora, PGY2  D/W Dr. Pink PGY3 and  Dr. Perdomo

## 2025-06-27 NOTE — CONSULT NOTE ADULT - SUBJECTIVE AND OBJECTIVE BOX
26yo G0 with RLQ pain and TVUS showing 4.5cm adnexal cyst, GYN consult for r/o torsion. Pt states she had mirena IUD placed in May 2025 and since then has had stabbing/cramping pain that comes/goes. On Tuesday this week the pain felt more severe so she went to her GYN, office TVUS showed findings consistent with ruptured cyst and that she has another ovarian cyst. She was instructed to report to ED if she had recurrence of pain. Today she had more episodes of cramping/stabbing RLQ pain that would come/go every few minutes, so she presented to ED. Currently she states her pain is mild, she has received no pain meds here at [facility]. She states when the pain was at its worst early this AM she had some associated N/V but currently denies nausea and just ate dinner. She denies syncope/presyncope, back pain, heavy vaginal bleeding.     OBHx: G0  GYN Hx: GYN at [facility] medical practice. Last pap 2025, denies hx abnormal pap. Hx gonorrhea 4yrs ago s/p tx. Regular heavy painful menses (why she had mirena placed)   PMHx: remote hx of asthma, not active since she quit vaping, anxiety/deperssion  Meds: sertraline, lamotrigine, buproprion  PSHx: wisdom teeth  Allergies: NKDA  Social hx: quit vaping; denies tobacco use. alcohol 1-2x/wk    PHYSICAL EXAM:   Vital Signs Last 24 Hrs  T(C): 36.8 (2025 23:12), Max: 37.2 (2025 21:01)  T(F): 98.2 (2025 23:12), Max: 99 (2025 21:01)  HR: 79 (2025 23:12) (79 - 103)  BP: 122/83 (2025 23:12) (118/84 - 150/90)  BP(mean): --  RR: 16 (2025 23:12) (16 - 18)  SpO2: 98% (2025 23:12) (97% - 98%)    Parameters below as of 2025 23:12  Patient On (Oxygen Delivery Method): room air        **************************  Constitutional: No acute distress, answering questions appropriately  Respiratory: no increased WOB  Abdominal: soft, non tender to deep palpation over right & left lower quadrants, nondistended, no rebound or guarding   Bimanual exam: No CMT. Mild R adnexal tenderness to palpation, no rebound guarding, no L adnexal tenderness   Speculum exam: deferred  Extremities: no calf tenderness     LABS:                        12.7   4.74  )-----------( 183      ( 2025 18:12 )             38.6         140  |  106  |  7   ----------------------------<  115[H]  3.8   |  28  |  0.76    Ca    8.9      2025 18:12    TPro  7.1  /  Alb  3.8  /  TBili  0.5  /  DBili  x   /  AST  24  /  ALT  21  /  AlkPhos  61        Urinalysis Basic - ( 2025 18:12 )    Color: Yellow / Appearance: Clear / S.009 / pH: x  Gluc: 115 mg/dL / Ketone: x  / Bili: Negative / Urobili: 0.2 mg/dL   Blood: x / Protein: Negative mg/dL / Nitrite: Negative   Leuk Esterase: Negative / RBC: 0 /HPF / WBC 0 /HPF   Sq Epi: x / Non Sq Epi: x / Bacteria: Negative /HPF      HCG Quantitative, Serum: <1 mIU/mL ( @ 18:12)      RADIOLOGY & ADDITIONAL STUDIES:  < from: US Transvaginal (25 @ 19:35) >    ACC: [id number] EXAM:  US PELVIC COMPLETE   ORDERED BY: JANA STROUD     ACC: [id number] EXAM:  US TRANSVAGINAL   ORDERED BY: JANA STROUD     PROCEDURE DATE:  2025          INTERPRETATION:  CLINICAL INFORMATION: Pelvic pain    LMP: 2025    COMPARISON: None available.    TECHNIQUE:  Endovaginal and transabdominal pelvic sonogram.    FINDINGS:  Uterus: 8.0 cm x 3.9 cm x 4.7 cm. No discrete myometrial mass  Endometrium: 4 mm. Well positioned intrauterine contraceptive device    Right ovary:5.4 cm x 3.6 cm x 4.6 cm. . Contains a 4.6 x 3.5 x 4 cm   complex cystic lesion with lacelike reticular echoes most in keeping with   a hemorrhagic cyst. Normal arterial waveforms.  Left ovary: 4.0 cm x 1.9 cm x 2.0 cm. Within normal limits. Normal   arterial waveforms.    Fluid: Small volume free fluid within the cul-de-sac    IMPRESSION:  Right ovary contains a 4.6 cm complex cystic lesion most in keeping with   a hemorrhagic cyst. Consider follow-up pelvic ultrasound in 8-12 weeks to   assess for expected interval resolution by the time    Small volume pelvic fluid        --- End of Report ---            NORMA LOERA MD; Attending Radiologist  This document has been electronically signed. 2025  7:42PM    < end of copied text >   *Note entry delayed 2/2 direct patient care     26yo G0 with RLQ pain and TVUS showing 4.5cm adnexal cyst, GYN consult for r/o torsion. Pt states she had mirena IUD placed in May 2025 and since then has had stabbing/cramping pain that comes/goes. On Tuesday this week the pain felt more severe so she went to her GYN, office TVUS showed findings consistent with ruptured cyst and that she has another ovarian cyst. She was instructed to report to ED if she had recurrence of pain. Today she had more episodes of cramping/stabbing RLQ pain that would come/go every few minutes, so she presented to ED. Currently she states her pain is mild, she has received no pain meds here at [facility]. She states when the pain was at its worst early this AM she had some associated N/V but currently denies nausea and just ate dinner. She denies syncope/presyncope, back pain, heavy vaginal bleeding.     OBHx: G0  GYN Hx: GYN at [facility] medical practice. Last pap 2025, denies hx abnormal pap. Hx gonorrhea 4yrs ago s/p tx. Regular heavy painful menses (why she had mirena placed)   PMHx: remote hx of asthma, not active since she quit vaping, anxiety/deperssion  Meds: sertraline, lamotrigine, buproprion  PSHx: wisdom teeth  Allergies: NKDA  Social hx: quit vaping; denies tobacco use. alcohol 1-2x/wk    PHYSICAL EXAM:   Vital Signs Last 24 Hrs  T(C): 36.8 (2025 23:12), Max: 37.2 (2025 21:01)  T(F): 98.2 (2025 23:12), Max: 99 (2025 21:01)  HR: 79 (2025 23:12) (79 - 103)  BP: 122/83 (2025 23:12) (118/84 - 150/90)  BP(mean): --  RR: 16 (2025 23:12) (16 - 18)  SpO2: 98% (2025 23:12) (97% - 98%)    Parameters below as of 2025 23:12  Patient On (Oxygen Delivery Method): room air        **************************  Constitutional: No acute distress, answering questions appropriately  Respiratory: no increased WOB  Abdominal: soft, non tender to deep palpation over right & left lower quadrants, nondistended, no rebound or guarding   Bimanual exam: No CMT. Mild R adnexal tenderness to palpation, no rebound guarding, no L adnexal tenderness   Speculum exam: deferred  Extremities: no calf tenderness     LABS:                        12.7   4.74  )-----------( 183      ( 2025 18:12 )             38.6         140  |  106  |  7   ----------------------------<  115[H]  3.8   |  28  |  0.76    Ca    8.9      2025 18:12    TPro  7.1  /  Alb  3.8  /  TBili  0.5  /  DBili  x   /  AST  24  /  ALT  21  /  AlkPhos  61        Urinalysis Basic - ( 2025 18:12 )    Color: Yellow / Appearance: Clear / S.009 / pH: x  Gluc: 115 mg/dL / Ketone: x  / Bili: Negative / Urobili: 0.2 mg/dL   Blood: x / Protein: Negative mg/dL / Nitrite: Negative   Leuk Esterase: Negative / RBC: 0 /HPF / WBC 0 /HPF   Sq Epi: x / Non Sq Epi: x / Bacteria: Negative /HPF      HCG Quantitative, Serum: <1 mIU/mL ( @ 18:12)      RADIOLOGY & ADDITIONAL STUDIES:  < from: US Transvaginal (25 @ 19:35) >    ACC: [id number] EXAM:  US PELVIC COMPLETE   ORDERED BY: JANA STROUD     ACC: [id number] EXAM:  US TRANSVAGINAL   ORDERED BY: JANA STROUD     PROCEDURE DATE:  2025          INTERPRETATION:  CLINICAL INFORMATION: Pelvic pain    LMP: 2025    COMPARISON: None available.    TECHNIQUE:  Endovaginal and transabdominal pelvic sonogram.    FINDINGS:  Uterus: 8.0 cm x 3.9 cm x 4.7 cm. No discrete myometrial mass  Endometrium: 4 mm. Well positioned intrauterine contraceptive device    Right ovary:5.4 cm x 3.6 cm x 4.6 cm. . Contains a 4.6 x 3.5 x 4 cm   complex cystic lesion with lacelike reticular echoes most in keeping with   a hemorrhagic cyst. Normal arterial waveforms.  Left ovary: 4.0 cm x 1.9 cm x 2.0 cm. Within normal limits. Normal   arterial waveforms.    Fluid: Small volume free fluid within the cul-de-sac    IMPRESSION:  Right ovary contains a 4.6 cm complex cystic lesion most in keeping with   a hemorrhagic cyst. Consider follow-up pelvic ultrasound in 8-12 weeks to   assess for expected interval resolution by the time    Small volume pelvic fluid        --- End of Report ---            NORMA LOERA MD; Attending Radiologist  This document has been electronically signed. 2025  7:42PM    < end of copied text >

## 2025-06-28 LAB
CULTURE RESULTS: SIGNIFICANT CHANGE UP
SPECIMEN SOURCE: SIGNIFICANT CHANGE UP

## 2025-07-14 ENCOUNTER — APPOINTMENT (OUTPATIENT)
Dept: OBGYN | Facility: CLINIC | Age: 25
End: 2025-07-14